# Patient Record
(demographics unavailable — no encounter records)

---

## 2024-11-06 NOTE — ASSESSMENT
[FreeTextEntry1] : 2-month-old male with mildly elevated glucose -6 phosphate dehydrogenase level of 21.7 U/g Hb (7.0-20.6).  Elevated glucose -6 phosphate dehydrogenase are common in babies and not concerning.  Screening is done for G6PD deficiency, which presents with LOW glucose -6 phosphate dehydrogenase level, and potentially could cause hemolytic anemia. Elevated levels are very common in babies and do not have any clinical implications. Patient has normal growth and weight gain. No developmental concerns.  Patient does not need further testing.    Continue regular follow up with pediatrician.

## 2024-11-06 NOTE — REASON FOR VISIT
[Consultation] : a consultation visit [Mother] : mother [FreeTextEntry1] : elevated glucose -6 phosphate dehydrogenase level

## 2024-11-06 NOTE — REVIEW OF SYSTEMS
[Change in Activity] : no change in activity [Rash] : no rash [Skin Lesions] : no skin lesions [Back Pain] : ~T no back pain [Chest Pain] : no chest pain [Cough] : no cough [Shortness of Breath] : no shortness of breath [Change in Appetite] : no change in appetite [Abdominal Pain] : no abdominal pain [Constipation] : no constipation [Headache] : no headache [Cold Intolerance] : cold tolerant [Heat Intolerance] : heat tolerant

## 2024-11-06 NOTE — HISTORY OF PRESENT ILLNESS
[FreeTextEntry2] : Aris is a 2-month-old male referred by PCP Dr. Stoll for evaluation of elevated glucose -6 phosphate level of 21.7 (7.0-20.6).   Mother reports that lab work was done due to inconclusive results of  screen. Patient was also advised to see Peds GI.   Baby is breastfeeding on demand Q 2-3 hrs.  He has normal amount of wet diapers and stooling every day.  Aris is smiling. He lifts his head when prone. No developmental concerns.

## 2024-11-06 NOTE — PAST MEDICAL HISTORY
[At Term] : at term [Normal Vaginal Route] : by normal vaginal route [Age Appropriate] : age appropriate developmental milestones met [FreeTextEntry1] : 7 lb 1 oz [FreeTextEntry4] : jaundice, did nit require phototherapy

## 2024-11-06 NOTE — CONSULT LETTER
[Dear  ___] : Dear  [unfilled], [Consult Letter:] : I had the pleasure of evaluating your patient, [unfilled]. [Please see my note below.] : Please see my note below. [Consult Closing:] : Thank you very much for allowing me to participate in the care of this patient.  If you have any questions, please do not hesitate to contact me. [Sincerely,] : Sincerely, [FreeTextEntry3] : Dilcia De La Cruz MD Pediatric Endocrinologist [facility]

## 2024-11-06 NOTE — PHYSICAL EXAM
[Healthy Appearing] : healthy appearing [Normal Appearance] : normal appearance [Well formed] : well formed [Normally Set] : normally set [WNL for age] : within normal limits of age [None] : there were no thyroid nodules [Normal S1 and S2] : normal S1 and S2 [Clear to Ausculation Bilaterally] : clear to auscultation bilaterally [Abdomen Soft] : soft [Abdomen Tenderness] : non-tender [] : no hepatosplenomegaly [1] : was Patrice stage 1 [Testes] : normal [___] : [unfilled] [Normal] : normal  [Goiter] : no goiter [Murmur] : no murmurs [FreeTextEntry1] : None